# Patient Record
Sex: MALE | Race: WHITE
[De-identification: names, ages, dates, MRNs, and addresses within clinical notes are randomized per-mention and may not be internally consistent; named-entity substitution may affect disease eponyms.]

---
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
## 2019-08-01 NOTE — NUR
THE PT WAS GIVEN D/C INST AND INST NOT TO DO DRUGS.  THE PT VERB UNDERSTANDING. 
 THE PT SERGIO. PO INTAKE AND IS CP FREE AT THIS TIME. 0/10 PAIN.

## 2019-08-01 NOTE — NUR
PT RESTING ON GURNEY WITH EYES CLOSED, DENIES PAIN OR NEEDS, PT STATED HE IS 
STILL NOT ABLE TO VOID AT THIS TIME, MONITORS IN PLACE, CALL LIGHT WITHIN 
REACH. PA UPDATED REGARDING PT NOT ABLE TO PROVIDE URINE SAMPLE

## 2019-11-18 NOTE — NUR
REVIEWED DISCHARGE INSTRUCTIONS AND PRINTED PRESCRIPTION X 1 W/ PT, VERBALIZED 
UNDERSTANDING TO INFORMATION PROVIDED INCLUDING FOLLOW UP CARE AND RETURN 
PRECAUTIONS.  PT DENIED HEARING VOICES AT TIME OF DISCHARGE, REPORTED NAUSEA 
HAD IMPROVED.  PT AMBULATED FROM ED.
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
## 2019-11-30 NOTE — NUR
PT BIB EMS FROM [place], CHECKED IN THIS AM FOR METH AND HEROIN WITHDRAWL, 
LAST USE THIS AM. PT WAS FOUND TO HAVE ROOM AIR SATS MID 80'S PLACED ON 2L NC 
O2 SAT 95. PT STS "I GOTTA GET CLEAN I HAVE NO CHOICE." PT ALSO REPORTS PNA DX 
2WKS AGO BUT REFUSED TX 

-------------------------------------------------------------------------------

Addendum: 11/30/19 at 1838 by [email or web address]

-------------------------------------------------------------------------------

PT MARCIES SI/HI

## 2020-03-12 ENCOUNTER — HOSPITAL ENCOUNTER (EMERGENCY)
Dept: HOSPITAL 8 - ED | Age: 65
Discharge: HOME | End: 2020-03-12
Payer: MEDICAID

## 2020-03-12 VITALS — HEIGHT: 69 IN | WEIGHT: 136.69 LBS | BODY MASS INDEX: 20.24 KG/M2

## 2020-03-12 VITALS — DIASTOLIC BLOOD PRESSURE: 95 MMHG | SYSTOLIC BLOOD PRESSURE: 155 MMHG
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
## 2020-03-12 DIAGNOSIS — R07.89: ICD-10-CM
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
## 2020-03-12 DIAGNOSIS — I25.2: ICD-10-CM
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
## 2020-03-12 DIAGNOSIS — S16.1XXA: ICD-10-CM

## 2020-03-12 DIAGNOSIS — Z86.73: ICD-10-CM

## 2020-03-12 DIAGNOSIS — S22.42XA: Primary | ICD-10-CM

## 2020-03-12 DIAGNOSIS — S06.9X1A: ICD-10-CM
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
## 2020-03-12 PROCEDURE — 70450 CT HEAD/BRAIN W/O DYE: CPT

## 2020-03-12 PROCEDURE — 72125 CT NECK SPINE W/O DYE: CPT

## 2020-03-12 PROCEDURE — 99285 EMERGENCY DEPT VISIT HI MDM: CPT

## 2020-03-12 PROCEDURE — 71111 X-RAY EXAM RIBS/CHEST4/> VWS: CPT

## 2020-03-12 NOTE — NUR
Patient presents to ER c/o bilat rib pain and neck pain post fall down stairs. 
Patient states he drank copious amounts of alcohol, which is not normal for 
him, and fell from being drunk. Patient states positive LOC. 

Patient is in NAD. Respirations even and unlabored.

## 2020-03-12 NOTE — NUR
Patient denied the need for IS or Toradol. Discharge instructions given. All 
questions and concerns addressed. Patient ambulatory with a steady gait. 
Belongings with patient.

## 2021-08-07 ENCOUNTER — HOSPITAL ENCOUNTER (INPATIENT)
Dept: HOSPITAL 8 - ED | Age: 66
LOS: 2 days | Discharge: LEFT BEFORE DISCHARGE | DRG: 193 | End: 2021-08-09
Attending: INTERNAL MEDICINE | Admitting: EMERGENCY MEDICINE
Payer: MEDICAID

## 2021-08-07 VITALS — HEIGHT: 68 IN | WEIGHT: 134.26 LBS | BODY MASS INDEX: 20.35 KG/M2

## 2021-08-07 DIAGNOSIS — Z91.14: ICD-10-CM

## 2021-08-07 DIAGNOSIS — Z66: ICD-10-CM

## 2021-08-07 DIAGNOSIS — I25.10: ICD-10-CM

## 2021-08-07 DIAGNOSIS — F20.9: ICD-10-CM

## 2021-08-07 DIAGNOSIS — Z86.718: ICD-10-CM

## 2021-08-07 DIAGNOSIS — J15.9: Primary | ICD-10-CM

## 2021-08-07 DIAGNOSIS — I69.351: ICD-10-CM

## 2021-08-07 DIAGNOSIS — J96.01: ICD-10-CM

## 2021-08-07 DIAGNOSIS — Z20.822: ICD-10-CM

## 2021-08-07 DIAGNOSIS — F17.210: ICD-10-CM

## 2021-08-07 DIAGNOSIS — I25.2: ICD-10-CM

## 2021-08-07 DIAGNOSIS — Z88.5: ICD-10-CM

## 2021-08-07 DIAGNOSIS — D64.9: ICD-10-CM

## 2021-08-07 DIAGNOSIS — Z53.29: ICD-10-CM

## 2021-08-07 DIAGNOSIS — Z91.19: ICD-10-CM

## 2021-08-07 DIAGNOSIS — Z95.5: ICD-10-CM

## 2021-08-07 DIAGNOSIS — E78.5: ICD-10-CM

## 2021-08-07 DIAGNOSIS — I10: ICD-10-CM

## 2021-08-07 LAB
ALBUMIN SERPL-MCNC: 3.2 G/DL (ref 3.4–5)
ALP SERPL-CCNC: 82 U/L (ref 45–117)
ALT SERPL-CCNC: 23 U/L (ref 12–78)
ANION GAP SERPL CALC-SCNC: 5 MMOL/L (ref 5–15)
BASOPHILS # BLD AUTO: 0.1 X10^3/UL (ref 0–0.1)
BASOPHILS NFR BLD AUTO: 1 % (ref 0–1)
BILIRUB SERPL-MCNC: 0.4 MG/DL (ref 0.2–1)
CALCIUM SERPL-MCNC: 8.3 MG/DL (ref 8.5–10.1)
CHLORIDE SERPL-SCNC: 101 MMOL/L (ref 98–107)
CREAT SERPL-MCNC: 0.78 MG/DL (ref 0.7–1.3)
EOSINOPHIL # BLD AUTO: 0 X10^3/UL (ref 0–0.4)
EOSINOPHIL NFR BLD AUTO: 0 % (ref 1–7)
ERYTHROCYTE [DISTWIDTH] IN BLOOD BY AUTOMATED COUNT: 13.5 % (ref 9.4–14.8)
LYMPHOCYTES # BLD AUTO: 1.6 X10^3/UL (ref 1–3.4)
LYMPHOCYTES NFR BLD AUTO: 15 % (ref 22–44)
MCH RBC QN AUTO: 31.5 PG (ref 27.5–34.5)
MCHC RBC AUTO-ENTMCNC: 34 G/DL (ref 33.2–36.2)
MONOCYTES # BLD AUTO: 1 X10^3/UL (ref 0.2–0.8)
MONOCYTES NFR BLD AUTO: 10 % (ref 2–9)
NEUTROPHILS # BLD AUTO: 8 X10^3/UL (ref 1.8–6.8)
NEUTROPHILS NFR BLD AUTO: 74 % (ref 42–75)
PLATELET # BLD AUTO: 244 X10^3/UL (ref 130–400)
PMV BLD AUTO: 8.6 FL (ref 7.4–10.4)
PROT SERPL-MCNC: 7.6 G/DL (ref 6.4–8.2)
RBC # BLD AUTO: 4.1 X10^6/UL (ref 4.38–5.82)
TROPONIN I SERPL-MCNC: < 0.015 NG/ML (ref 0–0.04)

## 2021-08-07 PROCEDURE — 99285 EMERGENCY DEPT VISIT HI MDM: CPT

## 2021-08-07 PROCEDURE — 87040 BLOOD CULTURE FOR BACTERIA: CPT

## 2021-08-07 PROCEDURE — 83735 ASSAY OF MAGNESIUM: CPT

## 2021-08-07 PROCEDURE — 84484 ASSAY OF TROPONIN QUANT: CPT

## 2021-08-07 PROCEDURE — 71045 X-RAY EXAM CHEST 1 VIEW: CPT

## 2021-08-07 PROCEDURE — 80061 LIPID PANEL: CPT

## 2021-08-07 PROCEDURE — 86140 C-REACTIVE PROTEIN: CPT

## 2021-08-07 PROCEDURE — 85025 COMPLETE CBC W/AUTO DIFF WBC: CPT

## 2021-08-07 PROCEDURE — 93005 ELECTROCARDIOGRAM TRACING: CPT

## 2021-08-07 PROCEDURE — 80053 COMPREHEN METABOLIC PANEL: CPT

## 2021-08-07 PROCEDURE — 85379 FIBRIN DEGRADATION QUANT: CPT

## 2021-08-07 PROCEDURE — U0003 INFECTIOUS AGENT DETECTION BY NUCLEIC ACID (DNA OR RNA); SEVERE ACUTE RESPIRATORY SYNDROME CORONAVIRUS 2 (SARS-COV-2) (CORONAVIRUS DISEASE [COVID-19]), AMPLIFIED PROBE TECHNIQUE, MAKING USE OF HIGH THROUGHPUT TECHNOLOGIES AS DESCRIBED BY CMS-2020-01-R: HCPCS

## 2021-08-07 PROCEDURE — 80307 DRUG TEST PRSMV CHEM ANLYZR: CPT

## 2021-08-07 PROCEDURE — 83880 ASSAY OF NATRIURETIC PEPTIDE: CPT

## 2021-08-07 PROCEDURE — 96374 THER/PROPH/DIAG INJ IV PUSH: CPT

## 2021-08-07 PROCEDURE — 36415 COLL VENOUS BLD VENIPUNCTURE: CPT

## 2021-08-07 PROCEDURE — 83615 LACTATE (LD) (LDH) ENZYME: CPT

## 2021-08-07 NOTE — NUR
PT C/O OF BODY ACHES, N/V, FEVER, HEADACHE, FATIGUED SINCE TWO DAYS AGO.

ATTACHED TO MONITORS, VSS, NADN. A&OX4, BREATHING EVEN AND UNLBAORED. 

WIFE IS POSITIVE WITH COVID. PT BELIEVES HE HAS COVID. 

PT RA AT 89%. BED IN LOW, RAILS ENGAGED. CALL LIGHT ON LAP.

## 2021-08-07 NOTE — NUR
Patient is resting comfortably in bed. Bed in lowest, rails engaged, call light 
on lap.

Vital Signs within normal limits. WCTM. ATTACHED TO CARD/SP02/BP MONITORS. GUERO

## 2021-08-08 VITALS — DIASTOLIC BLOOD PRESSURE: 69 MMHG | SYSTOLIC BLOOD PRESSURE: 107 MMHG

## 2021-08-08 VITALS — SYSTOLIC BLOOD PRESSURE: 112 MMHG | DIASTOLIC BLOOD PRESSURE: 78 MMHG

## 2021-08-08 VITALS — DIASTOLIC BLOOD PRESSURE: 75 MMHG | SYSTOLIC BLOOD PRESSURE: 111 MMHG

## 2021-08-08 VITALS — SYSTOLIC BLOOD PRESSURE: 116 MMHG | DIASTOLIC BLOOD PRESSURE: 69 MMHG

## 2021-08-08 LAB
CRP SERPL-MCNC: 6.4 MG/DL (ref 0.02–0.49)
TROPONIN I SERPL-MCNC: < 0.015 NG/ML (ref 0–0.04)
TROPONIN I SERPL-MCNC: < 0.015 NG/ML (ref 0–0.04)

## 2021-08-08 RX ADMIN — OXYCODONE HYDROCHLORIDE AND ACETAMINOPHEN SCH MG: 500 TABLET ORAL at 15:52

## 2021-08-08 RX ADMIN — CLOPIDOGREL SCH MG: 75 TABLET, FILM COATED ORAL at 11:28

## 2021-08-08 RX ADMIN — ZINC SULFATE CAP 220 MG (50 MG ELEMENTAL ZN) SCH MG: 220 (50 ZN) CAP at 11:29

## 2021-08-08 RX ADMIN — GUAIFENESIN SCH MG: 600 TABLET, EXTENDED RELEASE ORAL at 11:28

## 2021-08-08 RX ADMIN — CHOLECALCIFEROL TAB 125 MCG (5000 UNIT) SCH UNIT: 125 TAB at 11:29

## 2021-08-08 RX ADMIN — ENOXAPARIN SODIUM SCH MG: 40 INJECTION SUBCUTANEOUS at 04:17

## 2021-08-08 RX ADMIN — GUAIFENESIN SCH MG: 600 TABLET, EXTENDED RELEASE ORAL at 21:36

## 2021-08-08 NOTE — NUR
Patient is SLEEPING comfortably in bed. EYES CLOSED. Bed in lowest, rails 
engaged, call light on lap.

Vital Signs within normal limits. WCTM.

## 2021-08-09 VITALS — SYSTOLIC BLOOD PRESSURE: 122 MMHG | DIASTOLIC BLOOD PRESSURE: 81 MMHG

## 2021-08-09 VITALS — SYSTOLIC BLOOD PRESSURE: 121 MMHG | DIASTOLIC BLOOD PRESSURE: 80 MMHG

## 2021-08-09 LAB
CHOL/HDL RATIO: 2.5
HDL CHOL %: 40 % (ref 26–37)
HDL CHOLESTEROL (DIRECT): 51 MG/DL (ref 40–60)
LDL CHOLESTEROL,CALCULATED: 63 MG/DL (ref 54–169)
LDLC/HDLC SERPL: 1.2 {RATIO} (ref 0.5–3)
TRIGL SERPL-MCNC: 65 MG/DL (ref 50–200)
VLDLC SERPL CALC-MCNC: 13 MG/DL (ref 0–25)

## 2021-08-09 RX ADMIN — OXYCODONE HYDROCHLORIDE AND ACETAMINOPHEN SCH MG: 500 TABLET ORAL at 08:28

## 2021-08-09 RX ADMIN — CHOLECALCIFEROL TAB 125 MCG (5000 UNIT) SCH UNIT: 125 TAB at 08:28

## 2021-08-09 RX ADMIN — ENOXAPARIN SODIUM SCH MG: 40 INJECTION SUBCUTANEOUS at 03:34

## 2021-08-09 RX ADMIN — ZINC SULFATE CAP 220 MG (50 MG ELEMENTAL ZN) SCH MG: 220 (50 ZN) CAP at 08:28

## 2021-08-09 RX ADMIN — GUAIFENESIN SCH MG: 600 TABLET, EXTENDED RELEASE ORAL at 08:28

## 2021-08-09 RX ADMIN — CLOPIDOGREL SCH MG: 75 TABLET, FILM COATED ORAL at 08:28
